# Patient Record
Sex: FEMALE | Race: BLACK OR AFRICAN AMERICAN | Employment: UNEMPLOYED | URBAN - METROPOLITAN AREA
[De-identification: names, ages, dates, MRNs, and addresses within clinical notes are randomized per-mention and may not be internally consistent; named-entity substitution may affect disease eponyms.]

---

## 2018-07-05 ENCOUNTER — HOSPITAL ENCOUNTER (OUTPATIENT)
Dept: LAB | Age: 23
Discharge: HOME | End: 2018-07-05
Payer: OTHER GOVERNMENT

## 2018-07-05 LAB
CHLAMYDIA, EXTERNAL: NORMAL
HBSAG, EXTERNAL: NORMAL
HEPATITIS C AB,   EXT: NORMAL
HIV, EXTERNAL: NORMAL
N. GONORRHEA, EXTERNAL: NORMAL
RPR, EXTERNAL: NORMAL
RUBELLA, EXTERNAL: NORMAL
TYPE, ABO & RH, EXTERNAL: NORMAL

## 2018-07-05 PROCEDURE — 88175 CYTOPATH C/V AUTO FLUID REDO: CPT | Performed by: OBSTETRICS & GYNECOLOGY

## 2018-12-24 ENCOUNTER — HOSPITAL ENCOUNTER (OUTPATIENT)
Age: 23
Discharge: HOME | End: 2018-12-24
Attending: OBSTETRICS & GYNECOLOGY | Admitting: SPECIALIST
Payer: OTHER GOVERNMENT

## 2018-12-24 VITALS
OXYGEN SATURATION: 100 % | TEMPERATURE: 98.4 F | HEART RATE: 81 BPM | RESPIRATION RATE: 20 BRPM | WEIGHT: 164 LBS | HEIGHT: 61 IN | DIASTOLIC BLOOD PRESSURE: 57 MMHG | SYSTOLIC BLOOD PRESSURE: 106 MMHG | BODY MASS INDEX: 30.96 KG/M2

## 2018-12-24 PROBLEM — O40.3XX0 POLYHYDRAMNIOS IN THIRD TRIMESTER: Status: ACTIVE | Noted: 2018-12-24

## 2018-12-24 PROBLEM — O40.3XX0 POLYHYDRAMNIOS AFFECTING PREGNANCY IN THIRD TRIMESTER: Status: ACTIVE | Noted: 2018-12-24

## 2018-12-24 PROCEDURE — 59025 FETAL NON-STRESS TEST: CPT

## 2018-12-24 PROCEDURE — 99285 EMERGENCY DEPT VISIT HI MDM: CPT

## 2018-12-24 NOTE — H&P
HPI:  Adela Boykin dx with polyhydramnios. Here for routine NST as office closed for holiday    Subjective:     Adela Boykin, 24 y.o.   at 32w5d presents to L&D for NST. Assessment:  No past medical history on file. No past surgical history on file. No Known Allergies  Prior to Admission medications    Medication Sig Start Date End Date Taking? Authorizing Provider   prenatal 47/DPVF fum/folic/dha (PRENATAL-1 PO) Take 1 Tab by mouth daily. Yes Provider, Historical        Objective:     Vitals:  Patient Vitals for the past 12 hrs:   Temp Pulse Resp BP SpO2   18 0859 98.4 °F (36.9 °C)  20     18 0858     100 %   18 0853     100 %   18 0848  92  117/76      Physical Exam:  Patient without distress. Membranes:  Intact  Fetal Heart Rate: Baseline: 140 per minute  Variability: moderate  Accelerations: yes  Decelerations: isolated prolonged    Assessment/Plan:     Assessment:   Patient Active Problem List   Diagnosis Code    Polyhydramnios in third trimester O40. 3XX0    Polyhydramnios affecting pregnancy in third trimester O40. 3XX0     Plan: DC home with standard & ER labor precautions.  Follow-up in office for surveillance as scheduled     Signed By:  Celian Orona CNM     2018

## 2018-12-24 NOTE — PROGRESS NOTES
325/7 wks arrived ambulatory for scheduled  nst due to polyhydramnios   efm applied  Pt denies concerns at present sh e is feeling good fetal movement  0905  2 variiable decels noted with recovery to baseline after [id number]  1015  No decels noted in last 90 min  Category 1 tracing now   1025  k galdino gipson reviewed efm strip  She will come and see pt soon  1100  k galdino gipson in room to see pt  Strip reviewed  Order for disch receibved  Pt to f/u in office  for next nst  Prenatal precautions,kick counts reviewed  Pt vu  Time for questions given  1105 pt disch to home

## 2019-01-18 LAB — GRBS, EXTERNAL: NORMAL

## 2019-01-31 ENCOUNTER — HOSPITAL ENCOUNTER (INPATIENT)
Age: 24
LOS: 3 days | Discharge: HOME | End: 2019-02-03
Attending: OBSTETRICS & GYNECOLOGY | Admitting: SPECIALIST
Payer: OTHER GOVERNMENT

## 2019-01-31 PROBLEM — O42.90 PROM (PREMATURE RUPTURE OF MEMBRANES): Status: ACTIVE | Noted: 2019-01-31

## 2019-01-31 PROBLEM — Z34.90 ENCOUNTER FOR INDUCTION OF LABOR: Status: ACTIVE | Noted: 2019-01-31

## 2019-01-31 LAB
ABO + RH BLD: NORMAL
BASOPHILS # BLD: 0 K/UL (ref 0–0.1)
BASOPHILS NFR BLD: 0 % (ref 0–2)
BLOOD GROUP ANTIBODIES SERPL: NORMAL
DIFFERENTIAL METHOD BLD: ABNORMAL
EOSINOPHIL # BLD: 0.1 K/UL (ref 0–0.4)
EOSINOPHIL NFR BLD: 1 % (ref 0–5)
ERYTHROCYTE [DISTWIDTH] IN BLOOD BY AUTOMATED COUNT: 13.8 % (ref 11.6–14.5)
HCT VFR BLD AUTO: 33 % (ref 35–45)
HGB BLD-MCNC: 10.3 G/DL (ref 12–16)
LYMPHOCYTES # BLD: 2 K/UL (ref 0.9–3.6)
LYMPHOCYTES NFR BLD: 21 % (ref 21–52)
MCH RBC QN AUTO: 24.7 PG (ref 24–34)
MCHC RBC AUTO-ENTMCNC: 31.2 G/DL (ref 31–37)
MCV RBC AUTO: 79.1 FL (ref 74–97)
MONOCYTES # BLD: 0.7 K/UL (ref 0.05–1.2)
MONOCYTES NFR BLD: 7 % (ref 3–10)
NEUTS SEG # BLD: 6.7 K/UL (ref 1.8–8)
NEUTS SEG NFR BLD: 71 % (ref 40–73)
PLATELET # BLD AUTO: 167 K/UL (ref 135–420)
PMV BLD AUTO: 12.8 FL (ref 9.2–11.8)
RBC # BLD AUTO: 4.17 M/UL (ref 4.2–5.3)
SPECIMEN EXP DATE BLD: NORMAL
WBC # BLD AUTO: 9.4 K/UL (ref 4.6–13.2)

## 2019-01-31 PROCEDURE — 85025 COMPLETE CBC W/AUTO DIFF WBC: CPT

## 2019-01-31 PROCEDURE — 65270000029 HC RM PRIVATE

## 2019-01-31 PROCEDURE — 86900 BLOOD TYPING SEROLOGIC ABO: CPT

## 2019-01-31 PROCEDURE — 74011250636 HC RX REV CODE- 250/636: Performed by: ADVANCED PRACTICE MIDWIFE

## 2019-01-31 RX ORDER — MAG HYDROX/ALUMINUM HYD/SIMETH 200-200-20
15 SUSPENSION, ORAL (FINAL DOSE FORM) ORAL
Status: DISCONTINUED | OUTPATIENT
Start: 2019-01-31 | End: 2019-02-01 | Stop reason: HOSPADM

## 2019-01-31 RX ORDER — OXYTOCIN/0.9 % SODIUM CHLORIDE 30/500 ML
0-25 PLASTIC BAG, INJECTION (ML) INTRAVENOUS
Status: DISCONTINUED | OUTPATIENT
Start: 2019-01-31 | End: 2019-02-03 | Stop reason: HOSPADM

## 2019-01-31 RX ORDER — ONDANSETRON 2 MG/ML
4 INJECTION INTRAMUSCULAR; INTRAVENOUS
Status: DISCONTINUED | OUTPATIENT
Start: 2019-01-31 | End: 2019-02-01 | Stop reason: HOSPADM

## 2019-01-31 RX ORDER — TERBUTALINE SULFATE 1 MG/ML
0.25 INJECTION SUBCUTANEOUS
Status: DISCONTINUED | OUTPATIENT
Start: 2019-01-31 | End: 2019-02-01 | Stop reason: HOSPADM

## 2019-01-31 RX ORDER — LIDOCAINE HYDROCHLORIDE 10 MG/ML
30 INJECTION, SOLUTION EPIDURAL; INFILTRATION; INTRACAUDAL; PERINEURAL AS NEEDED
Status: DISCONTINUED | OUTPATIENT
Start: 2019-01-31 | End: 2019-02-01 | Stop reason: HOSPADM

## 2019-01-31 RX ORDER — OXYTOCIN/RINGER'S LACTATE 20/1000 ML
999 PLASTIC BAG, INJECTION (ML) INTRAVENOUS ONCE
Status: DISPENSED | OUTPATIENT
Start: 2019-01-31 | End: 2019-02-01

## 2019-01-31 RX ORDER — METHYLERGONOVINE MALEATE 0.2 MG/ML
0.2 INJECTION INTRAVENOUS AS NEEDED
Status: DISCONTINUED | OUTPATIENT
Start: 2019-01-31 | End: 2019-02-01 | Stop reason: HOSPADM

## 2019-01-31 RX ORDER — MISOPROSTOL 200 UG/1
800 TABLET ORAL
Status: DISCONTINUED | OUTPATIENT
Start: 2019-01-31 | End: 2019-02-01 | Stop reason: HOSPADM

## 2019-01-31 RX ORDER — TRISODIUM CITRATE DIHYDRATE AND CITRIC ACID MONOHYDRATE 500; 334 MG/5ML; MG/5ML
30 SOLUTION ORAL AS NEEDED
Status: DISCONTINUED | OUTPATIENT
Start: 2019-01-31 | End: 2019-02-01 | Stop reason: HOSPADM

## 2019-01-31 RX ORDER — OXYTOCIN/RINGER'S LACTATE 20/1000 ML
125 PLASTIC BAG, INJECTION (ML) INTRAVENOUS CONTINUOUS
Status: DISCONTINUED | OUTPATIENT
Start: 2019-01-31 | End: 2019-02-01 | Stop reason: HOSPADM

## 2019-01-31 RX ORDER — NALBUPHINE HYDROCHLORIDE 10 MG/ML
10 INJECTION, SOLUTION INTRAMUSCULAR; INTRAVENOUS; SUBCUTANEOUS
Status: DISCONTINUED | OUTPATIENT
Start: 2019-01-31 | End: 2019-02-01 | Stop reason: HOSPADM

## 2019-01-31 RX ORDER — CARBOPROST TROMETHAMINE 250 UG/ML
250 INJECTION, SOLUTION INTRAMUSCULAR
Status: DISCONTINUED | OUTPATIENT
Start: 2019-01-31 | End: 2019-02-01 | Stop reason: HOSPADM

## 2019-01-31 RX ORDER — ACETAMINOPHEN 650 MG/1
650 SUPPOSITORY RECTAL
Status: DISCONTINUED | OUTPATIENT
Start: 2019-01-31 | End: 2019-02-01 | Stop reason: HOSPADM

## 2019-01-31 RX ORDER — SODIUM CHLORIDE, SODIUM LACTATE, POTASSIUM CHLORIDE, CALCIUM CHLORIDE 600; 310; 30; 20 MG/100ML; MG/100ML; MG/100ML; MG/100ML
125 INJECTION, SOLUTION INTRAVENOUS CONTINUOUS
Status: DISCONTINUED | OUTPATIENT
Start: 2019-01-31 | End: 2019-02-01 | Stop reason: HOSPADM

## 2019-01-31 RX ADMIN — SODIUM CHLORIDE, SODIUM LACTATE, POTASSIUM CHLORIDE, AND CALCIUM CHLORIDE 125 ML/HR: 600; 310; 30; 20 INJECTION, SOLUTION INTRAVENOUS at 17:02

## 2019-01-31 RX ADMIN — OXYTOCIN-SODIUM CHLORIDE 0.9% IV SOLN 30 UNIT/500ML 2 MILLI-UNITS/MIN: 30-0.9/5 SOLUTION at 17:02

## 2019-01-31 NOTE — PROGRESS NOTES
1457- Pt via wheelchair from [facility] office due to SROM, pt states may have ruptured 2 days prior with a slow leak. Confirmed via ferning in office today by Dr Theo Peralta. Will admit for pitocin augmentation.

## 2019-01-31 NOTE — H&P
History & Physical 
 
Name: Jefry Bolaños MRN: [id number]  SSN: [id number] YOB: 1995  Age: 24 y.o. Sex: female Subjective:  
 
Estimated Date of Delivery: 19 OB History  Para Term  AB Living 2 1 1 0 0 0 SAB TAB Ectopic Molar Multiple Live Births  
 0 0 0 0 0 0 Ms. Michael Degree is admitted with pregnancy at 38w1d for induction of labor after being seen in the office today by Dr Yony Lind for leaking of fluid x2 days. Office exam revealed Neg pooling, equivocal nitrazine and positive ferning. Prenatal course was complicated by polyhydramnios. US today revealed an SDP of 6 cms, resolved polyhydramnios after being followed for poly from 31 wks. Prenatal care has been followed by Leonid Santos from 8 wk dating scan. Please see prenatal records for details. Previous birth in [place] in  for an  at 41 wks of 5lb 16oz male. Also had PROM and reports what sounds like chorioamnionitis. Discussed this as risk to current labor, treatment for mother, assessment and possible treatment for baby. Her questions were answered. Discussed initiation of labor to review pitocin and cytotec R/B/A and answered her questions. She would like to proceed with pitocin. Is wanting to try to avoid epidural this time. Reassured that we will follow her preferences either way. History reviewed. No pertinent past medical history. History reviewed. No pertinent surgical history. Social History Occupational History  Not on file Tobacco Use  Smoking status: Never Smoker  Smokeless tobacco: Never Used Substance and Sexual Activity  Alcohol use: No  
  Frequency: Never  Drug use: No  
 Sexual activity: Yes Family History Problem Relation Age of Onset  Hypertension Mother  Diabetes Father No Known Allergies Prior to Admission medications Medication Sig Start Date End Date Taking? Authorizing Provider prenatal 12/UGCV fum/folic/dha (PRENATAL-1 PO) Take 1 Tab by mouth daily. Provider, Historical  
  
 
Review of Systems: Pertinent items are noted in HPI. Objective:  
 
Vitals: 
Vitals:  
 01/31/19 1502 Weight: 75.3 kg (166 lb) Height: 5' 1\" (1.549 m) Physical Exam: 
Patient without distress. Abdomen: soft, nontender Fundus: soft and non tender Perineum: blood absent, amniotic fluid present in office for pos ferning. Cervical Exam: 2/50/-3 by Dr Marleni Arellano Lower Extremities:  - Edema No 
Membranes:  Premature Rupture of Membranes; Amniotic Fluid: clear fluid by report Fetal Heart Rate: Baseline: Reactive from 135 per minute Variability: moderate Accelerations: yes Decelerations: none Uterine contractions: mild and irregular Prenatal Labs:  
Lab Results Component Value Date/Time  
 Rubella, External Immune 07/05/2018 GrBStrep, External Neg 01/18/2019 HBsAg, External Neg 07/05/2018 HIV, External Neg 07/05/2018 RPR, External NR 07/05/2018 Gonorrhea, External Neg 07/05/2018 Chlamydia, External Neg 07/05/2018 Group B Strep was negative. Assessment/Plan:  
Assessment: IUP at 38 wks with PROM x 2 days per pt report. Reactive FHTs. GBS neg. Plan: Admit for induction of labor with pitocin. Dr Kyle Jaevd is in house and notified of the patient by Dr Marleni Arellano. [facility] Signed By:  Sharon Castleman, CNM January 31, 2019

## 2019-02-01 ENCOUNTER — ANESTHESIA (OUTPATIENT)
Dept: LABOR AND DELIVERY | Age: 24
End: 2019-02-01
Payer: OTHER GOVERNMENT

## 2019-02-01 ENCOUNTER — ANESTHESIA EVENT (OUTPATIENT)
Dept: LABOR AND DELIVERY | Age: 24
End: 2019-02-01
Payer: OTHER GOVERNMENT

## 2019-02-01 PROCEDURE — 3E0R3BZ INTRODUCTION OF ANESTHETIC AGENT INTO SPINAL CANAL, PERCUTANEOUS APPROACH: ICD-10-PCS | Performed by: SPECIALIST

## 2019-02-01 PROCEDURE — 74011000250 HC RX REV CODE- 250

## 2019-02-01 PROCEDURE — 74011250636 HC RX REV CODE- 250/636: Performed by: NURSE ANESTHETIST, CERTIFIED REGISTERED

## 2019-02-01 PROCEDURE — 77030007879 HC KT SPN EPDRL TELE -B: Performed by: NURSE ANESTHETIST, CERTIFIED REGISTERED

## 2019-02-01 PROCEDURE — 74011250637 HC RX REV CODE- 250/637: Performed by: OBSTETRICS & GYNECOLOGY

## 2019-02-01 PROCEDURE — 00HU33Z INSERTION OF INFUSION DEVICE INTO SPINAL CANAL, PERCUTANEOUS APPROACH: ICD-10-PCS | Performed by: SPECIALIST

## 2019-02-01 PROCEDURE — 74011250636 HC RX REV CODE- 250/636

## 2019-02-01 PROCEDURE — 75410000003 HC RECOV DEL/VAG/CSECN EA 0.5 HR

## 2019-02-01 PROCEDURE — 10907ZC DRAINAGE OF AMNIOTIC FLUID, THERAPEUTIC FROM PRODUCTS OF CONCEPTION, VIA NATURAL OR ARTIFICIAL OPENING: ICD-10-PCS | Performed by: SPECIALIST

## 2019-02-01 PROCEDURE — 76060000078 HC EPIDURAL ANESTHESIA

## 2019-02-01 PROCEDURE — 74011250637 HC RX REV CODE- 250/637: Performed by: ADVANCED PRACTICE MIDWIFE

## 2019-02-01 PROCEDURE — 75410000000 HC DELIVERY VAGINAL/SINGLE

## 2019-02-01 PROCEDURE — 75410000002 HC LABOR FEE PER 1 HR

## 2019-02-01 PROCEDURE — 74011000250 HC RX REV CODE- 250: Performed by: NURSE ANESTHETIST, CERTIFIED REGISTERED

## 2019-02-01 PROCEDURE — 65270000029 HC RM PRIVATE

## 2019-02-01 PROCEDURE — 74011250636 HC RX REV CODE- 250/636: Performed by: ADVANCED PRACTICE MIDWIFE

## 2019-02-01 PROCEDURE — 74011250637 HC RX REV CODE- 250/637

## 2019-02-01 RX ORDER — HYDROCORTISONE 25 MG/G
CREAM TOPICAL
Status: DISCONTINUED | OUTPATIENT
Start: 2019-02-01 | End: 2019-02-03 | Stop reason: HOSPADM

## 2019-02-01 RX ORDER — FENTANYL CITRATE 50 UG/ML
INJECTION, SOLUTION INTRAMUSCULAR; INTRAVENOUS
Status: COMPLETED
Start: 2019-02-01 | End: 2019-02-01

## 2019-02-01 RX ORDER — CASTOR OIL 100 %
OIL (ML) ORAL
Status: COMPLETED
Start: 2019-02-01 | End: 2019-02-01

## 2019-02-01 RX ORDER — EPHEDRINE SULFATE 50 MG/ML
10 INJECTION, SOLUTION INTRAVENOUS AS NEEDED
Status: DISCONTINUED | OUTPATIENT
Start: 2019-02-01 | End: 2019-02-01 | Stop reason: HOSPADM

## 2019-02-01 RX ORDER — IBUPROFEN 400 MG/1
800 TABLET ORAL
Status: DISCONTINUED | OUTPATIENT
Start: 2019-02-01 | End: 2019-02-03 | Stop reason: HOSPADM

## 2019-02-01 RX ORDER — AMOXICILLIN 250 MG
1 CAPSULE ORAL
Status: DISCONTINUED | OUTPATIENT
Start: 2019-02-01 | End: 2019-02-03 | Stop reason: HOSPADM

## 2019-02-01 RX ORDER — PHENYLEPHRINE HCL IN 0.9% NACL 0.4MG/10ML
100 SYRINGE (ML) INTRAVENOUS AS NEEDED
Status: DISCONTINUED | OUTPATIENT
Start: 2019-02-01 | End: 2019-02-01 | Stop reason: HOSPADM

## 2019-02-01 RX ORDER — ACETAMINOPHEN 325 MG/1
650 TABLET ORAL
Status: DISCONTINUED | OUTPATIENT
Start: 2019-02-01 | End: 2019-02-03 | Stop reason: HOSPADM

## 2019-02-01 RX ORDER — OXYCODONE AND ACETAMINOPHEN 5; 325 MG/1; MG/1
2 TABLET ORAL
Status: DISCONTINUED | OUTPATIENT
Start: 2019-02-01 | End: 2019-02-03 | Stop reason: HOSPADM

## 2019-02-01 RX ORDER — ZOLPIDEM TARTRATE 5 MG/1
5 TABLET ORAL
Status: DISCONTINUED | OUTPATIENT
Start: 2019-02-01 | End: 2019-02-03 | Stop reason: HOSPADM

## 2019-02-01 RX ORDER — FENTANYL CITRATE 50 UG/ML
100 INJECTION, SOLUTION INTRAMUSCULAR; INTRAVENOUS ONCE
Status: COMPLETED | OUTPATIENT
Start: 2019-02-01 | End: 2019-02-01

## 2019-02-01 RX ORDER — ROPIVACAINE HYDROCHLORIDE 2 MG/ML
INJECTION, SOLUTION EPIDURAL; INFILTRATION; PERINEURAL AS NEEDED
Status: DISCONTINUED | OUTPATIENT
Start: 2019-02-01 | End: 2019-02-01 | Stop reason: HOSPADM

## 2019-02-01 RX ORDER — PROMETHAZINE HYDROCHLORIDE 25 MG/ML
25 INJECTION, SOLUTION INTRAMUSCULAR; INTRAVENOUS
Status: DISCONTINUED | OUTPATIENT
Start: 2019-02-01 | End: 2019-02-03 | Stop reason: HOSPADM

## 2019-02-01 RX ADMIN — DOCUSATE SODIUM AND SENNOSIDES 1 TABLET: 8.6; 5 TABLET, FILM COATED ORAL at 18:26

## 2019-02-01 RX ADMIN — CASTOR OIL: 1 LIQUID ORAL at 04:45

## 2019-02-01 RX ADMIN — Medication 125 ML/HR: at 04:48

## 2019-02-01 RX ADMIN — ROPIVACAINE HYDROCHLORIDE 6 ML: 2 INJECTION, SOLUTION EPIDURAL; INFILTRATION; PERINEURAL at 03:25

## 2019-02-01 RX ADMIN — NALBUPHINE HYDROCHLORIDE 10 MG: 10 INJECTION, SOLUTION INTRAMUSCULAR; INTRAVENOUS; SUBCUTANEOUS at 00:34

## 2019-02-01 RX ADMIN — EPHEDRINE SULFATE 10 MG: 50 INJECTION, SOLUTION INTRAVENOUS at 03:46

## 2019-02-01 RX ADMIN — SODIUM CHLORIDE, SODIUM LACTATE, POTASSIUM CHLORIDE, AND CALCIUM CHLORIDE 125 ML/HR: 600; 310; 30; 20 INJECTION, SOLUTION INTRAVENOUS at 00:30

## 2019-02-01 RX ADMIN — Medication 10 ML/HR: at 03:26

## 2019-02-01 RX ADMIN — FENTANYL CITRATE 100 MCG: 50 INJECTION, SOLUTION INTRAMUSCULAR; INTRAVENOUS at 03:20

## 2019-02-01 RX ADMIN — HYDROCORTISONE 2.5%: 25 CREAM TOPICAL at 15:33

## 2019-02-01 RX ADMIN — SODIUM CHLORIDE, SODIUM LACTATE, POTASSIUM CHLORIDE, AND CALCIUM CHLORIDE 500 ML: 600; 310; 30; 20 INJECTION, SOLUTION INTRAVENOUS at 03:03

## 2019-02-01 RX ADMIN — Medication 1 SPRAY: at 10:28

## 2019-02-01 RX ADMIN — SODIUM CHLORIDE, SODIUM LACTATE, POTASSIUM CHLORIDE, AND CALCIUM CHLORIDE 1000 ML: 600; 310; 30; 20 INJECTION, SOLUTION INTRAVENOUS at 03:02

## 2019-02-01 RX ADMIN — IBUPROFEN 800 MG: 400 TABLET ORAL at 18:25

## 2019-02-01 NOTE — ROUTINE PROCESS
TRANSFER - IN REPORT: 
 
Verbal report received from NEO Mejias RN(name) on [facility]  being received from L&D recovery(unit) for routine progression of care Report consisted of patients Situation, Background, Assessment and  
Recommendations(SBAR). Information from the following report(s) SBAR, Kardex, Procedure Summary, Intake/Output and Recent Results was reviewed with the receiving nurse. Opportunity for questions and clarification was provided. Assessment completed upon patients arrival to unit and care assumed. 0840--assisted out of bed to the bathroom and voided qs--tolerated well without weakness--legs able to bear weight--chelly care reviewed--back to bed--assessment done--denies pain--may be up and about on her own--has a hemorrhoid--will order hydrocortisone cream and will provide Dermaplast Spray and Tucks for soreness--po fluids encouraged. 1028--instructed on the use of Tucks and Dermaplast Spray--verbalizes understanding 1240--voiding qs--remains comfortable 1535--plans to shower--denies pain. 1830--vital signs stable--voiding qs--breast feeing is a work in progress.

## 2019-02-01 NOTE — PROGRESS NOTES
Problem: Falls - Risk of 
Goal: *Absence of Falls Document Gregoria Schroeder Fall Risk and appropriate interventions in the flowsheet. Outcome: Progressing Towards Goal 
Fall Risk Interventions:

## 2019-02-01 NOTE — LACTATION NOTE
Mom states baby has nursed well but, sleepy at last feeding. Discussed colostrum, size of stomach, nursing pattern/expectations, latch. Encouraged to ask for help with feedings when baby ready to eat. Info sheet, daily log and resource list given.

## 2019-02-01 NOTE — PROGRESS NOTES
Pt transferred to [facility]- pt up and amb with on c/o- report given to [facility] per ramone gonzalez

## 2019-02-01 NOTE — PROGRESS NOTES
Labor Progress Note Jeffrey Everett is feeling comfortable with her epidural. Variables noted with FHTs. Mccloud placed. Pelvic exam:     
 Cervical Exam 
Dilation (cm): stretchy 7 Eff: 80 % Station: 0 Vaginal exam done by? : Malia Palacios RN 
Membrane Status: AROM(by Margaret Gandara CNM) FHTs 120s with moderate variability and variables noted. UCs q 2-3 with pitocin at 8 mu 
/65 Mat pulse is 114 Patient Vitals for the past 4 hrs: 
 Temp Pulse BP  
19 0131  72 99/65  
19 0101  65 97/56  
19 0032  (!) 143 105/62  
19 0001 97.7 °F (36.5 °C) 67 113/69 Temp (24hrs), Av.9 °F (36.6 °C), Min:97.7 °F (36.5 °C), Max:98.1 °F (36.7 °C) Recent Labs  
  19 
1600 WBC 9.4 HGB 10.3* Assessment:Good pain management. Progressing. Category 2 tracing. Plan: Anticipate . Donald Ontiveros CNM 
2019 
3:34 AM

## 2019-02-01 NOTE — ROUTINE PROCESS
[facility] care of patient, received report from Alessia Rod 32 Patient sitting up on side of bed, FOB & patient's son at bedside, patient denies any needs at this time. 2200 Patient denies any needs at this time. 0030 Patient c/o pain 7/10 with contractions, requests pain medication. 0215 AROM clear fluid noted by GERBER Asher CNM, SVE 6/80/0 
0300 Patient c/o pain 9/10 with contractions requests epidural, Jennifer Bailey CRNA at bedside to place epidural catheter. 9623 Test dose administered through epidural catheter by Boston Shetty CNM at bedside, SVE 7/80/0. 
0400 CNM notified of low blood pressures and Ephedrine given, patient is asymptomatic, fetal heart tracing variables resolved with position change. 0406 Epidural Fetanyl rate change to 6 mu. 
0425 GERBER Asher CNM at bedside, patient starting to push. 0448  of viable baby girl by Dr. ROLAND [facility] at bedside to evaluate infant. 5076 Bedside and Verbal shift change report given to Zafar Kirkland RN (oncoming nurse) by Leigh Ann Bearden RN (offgoing nurse). Report included the following information SBAR, Kardex, Intake/Output, MAR and Recent Results.

## 2019-02-01 NOTE — ANESTHESIA POSTPROCEDURE EVALUATION
* No procedures listed *. Anesthesia Post Evaluation Patient location during evaluation: bedside Patient participation: complete - patient participated Level of consciousness: awake and alert Pain management: satisfactory to patient Airway patency: patent Anesthetic complications: no 
Cardiovascular status: stable Respiratory status: room air Hydration status: stable Post anesthesia nausea and vomiting:  none Visit Vitals /70 (BP 1 Location: Right arm, BP Patient Position: At rest) Pulse 90 Temp 37.1 °C (98.7 °F) Resp 16 Ht 5' 1\" (1.549 m) Wt 75.3 kg (166 lb) Breastfeeding? No  
BMI 31.37 kg/m²

## 2019-02-01 NOTE — ANESTHESIA PROCEDURE NOTES
Epidural Block Start time: 2/1/2019 3:00 AM 
End time: 2/1/2019 3:26 AM 
Performed by: Claudia Raman CRNA Authorized by: Claudia Raman CRNA Pre-Procedure Indication: labor epidural   
Preanesthetic Checklist: patient identified, risks and benefits discussed, anesthesia consent, site marked, patient being monitored, timeout performed and anesthesia consent Timeout Time: 03:07 Epidural:  
Patient position:  Seated Prep region:  Lumbar Prep: Betadine Location:  L3-4 Needle and Epidural Catheter:  
Needle Type:  Tuohy Needle Gauge:  18 G Injection Technique:  Loss of resistance using saline Attempts:  2 Catheter Size:  20 G Catheter at Skin Depth (cm):  12 Depth in Epidural Space (cm):  5 Events: no blood with aspiration, no cerebrospinal fluid with aspiration, no paresthesia and negative aspiration test   
Test Dose:  Negative Assessment:  
Catheter Secured:  Tape and tegaderm Insertion:  Uncomplicated Patient tolerance:  Patient tolerated the procedure well with no immediate complications

## 2019-02-01 NOTE — PROGRESS NOTES
Labor Progress Note Bonnie Lawler is doing well. Discussed AROM. She is considering an epidural.  Offer to wait for AROM after epidural but she opts to have AROM and assess coping. Initially after AROM wants to proceed unmedicated but changes her mind with next contraction. Pelvic exam:     
 Cervical Exam 
Dilation (cm): 6 Eff: 80 % Station: 0 Vaginal exam done by? : Panchito Arango, RN 
Membrane Status: AROM(by Humera Eller CNM) FHTs 125 with moderate variability, brief variables UCs q 2-3 with pitocin at 8 mu Fetal Heart rate: No data found. Patient Vitals for the past 4 hrs: 
 Temp Pulse BP  
19 0131  72 99/65  
19 0101  65 97/56  
19 0032  (!) 143 105/62  
19 0001 97.7 °F (36.5 °C) 67 113/69  
19 2331  66 116/67  
19 2301  74 96/60  
19 2231  69 107/76 Temp (24hrs), Av.9 °F (36.6 °C), Min:97.7 °F (36.5 °C), Max:98.1 °F (36.7 °C) Recent Labs  
  19 
1600 WBC 9.4 HGB 10.3* Assessment:Progressing well. Maternal and fetal wellbeing. Plan: Epidural planned. Anticipate . Lindy Samuels CNM 
2019 
2:29 AM

## 2019-02-01 NOTE — ANESTHESIA PREPROCEDURE EVALUATION
Anesthetic History No history of anesthetic complications Review of Systems / Medical History Pulmonary Within defined limits Neuro/Psych Within defined limits Cardiovascular Within defined limits GI/Hepatic/Renal 
Within defined limits Endo/Other Within defined limits Other Findings Physical Exam 
 
Airway Mallampati: II 
TM Distance: > 6 cm Neck ROM: normal range of motion Mouth opening: Normal 
 
 Cardiovascular Regular rate and rhythm,  S1 and S2 normal,  no murmur, click, rub, or gallop Dental 
No notable dental hx Pulmonary Breath sounds clear to auscultation Abdominal 
GI exam deferred Other Findings Anesthetic Plan ASA: 2 Anesthetic plan and risks discussed with: Patient

## 2019-02-01 NOTE — L&D DELIVERY NOTE
Delivery Summary    Patient: Dominik Valentino MRN: [id number]  SSN: [id number]    YOB: 1995  Age: 24 y.o. Sex: female       Information for the patient's :  Junior Velasquez [[id number]]       Labor Events:    Labor: No   Rupture Date: 2019   Rupture Time: 2:24 AM   Rupture Type AROM   Amniotic Fluid Volume:      Amniotic Fluid Description: Clear     Induction: Oxytocin       Augmentation: Oxytocin   Labor Events: None     Cervical Ripening:     None     Delivery Events:  Episiotomy: None   Laceration(s): Left labial     Repaired: None    Number of Repair Packets:     Suture Type and Size: None     Estimated Blood Loss (ml):  ml 300cc         Delivery Date: 2019    Delivery Time: 4:48 AM  Delivery Type: Vaginal, Spontaneous  Sex:  Female     Gestational Age: 36w4d   Delivery Clinician:  Mark Asher/Lyn BRAY  Living Status: Living   Delivery Location: L&D      Controlled spontaneous vaginal delivery of a vigorous female infant with a snug nuchal cord delivered via sommersault maneuver onto maternal abdomen by KATARINA Gerardo. Good cry. Nursery to the bedside. Apgars 8/9. Signs of placental separation prompt double clamping of the cord for cut by dad. Placenta delivered spontaneously and intact via Schultze mechanism. 3VC noted. Vagina and perineum intact. Left labial laceration and right labial laceration hemostatic, discussed with Ilia and left unrepaired.  cc Dyad stable and recovering in the birthing room skin to skin. Dr Tor Talavera is in house and notified of the birth.  Ashok Goltz, CNM            APGARS  One minute Five minutes Ten minutes   Skin color: 0   1        Heart rate: 2   2        Grimace: 2   2        Muscle tone: 2   2        Breathin   2        Totals: 8   9            Presentation: Vertex    Position:   Occiput Anterior  Resuscitation Method:  Tactile Stimulation     Meconium Stained: None      Cord Information: 3 Vessels  Complications: None;Nuchal Cord Without Compressions  Cord around: shoulders  Delayed cord clamping? Yes  Cord clamped date/time:2019  4:52 AM  Disposition of Cord Blood: Lab    Blood Gases Sent?: No    Placenta:  Date/Time: 2019  4:53 AM  Removal: Spontaneous      Appearance: Normal      Measurements:  Birth Weight:        Birth Length:        Head Circumference:        Chest Circumference:       Abdominal Girth: Other Providers:   ;Verne Mcardle J;;;;;HERBER NAJERA;;Nina ROWLAND., Obstetrician;Primary Nurse;Primary  Nurse;Nicu Nurse;Neonatologist;Anesthesiologist;Pediatrician;Nurse Practitioner;Midwife;Nursery Nurse           Group B Strep:   Lab Results   Component Value Date/Time    Majo, External Neg 2019     Information for the patient's :  Lela Mora [[id number]]   No results found for: ABORH, PCTABR, PCTDIG, BILI, ABORHEXT, ABORH    No results for input(s): PCO2CB, PO2CB, HCO3I, SO2I, IBD, PTEMPI, SPECTI, PHICB, ISITE, IDEV, IALLEN in the last 72 hours.

## 2019-02-02 PROBLEM — Z34.90 ENCOUNTER FOR INDUCTION OF LABOR: Status: RESOLVED | Noted: 2019-01-31 | Resolved: 2019-02-02

## 2019-02-02 PROBLEM — O42.90 PROM (PREMATURE RUPTURE OF MEMBRANES): Status: RESOLVED | Noted: 2019-01-31 | Resolved: 2019-02-02

## 2019-02-02 LAB
HCT VFR BLD AUTO: 30.2 % (ref 35–45)
HGB BLD-MCNC: 9.1 G/DL (ref 12–16)

## 2019-02-02 PROCEDURE — 74011250637 HC RX REV CODE- 250/637: Performed by: ADVANCED PRACTICE MIDWIFE

## 2019-02-02 PROCEDURE — 36415 COLL VENOUS BLD VENIPUNCTURE: CPT

## 2019-02-02 PROCEDURE — 85018 HEMOGLOBIN: CPT

## 2019-02-02 PROCEDURE — 85014 HEMATOCRIT: CPT

## 2019-02-02 PROCEDURE — 65270000029 HC RM PRIVATE

## 2019-02-02 RX ORDER — DOCUSATE SODIUM 100 MG/1
100 CAPSULE, LIQUID FILLED ORAL
Qty: 60 CAP | Refills: 0 | Status: SHIPPED | OUTPATIENT
Start: 2019-02-02 | End: 2019-05-03

## 2019-02-02 RX ORDER — IBUPROFEN 800 MG/1
800 TABLET ORAL
Qty: 30 TAB | Refills: 0 | Status: SHIPPED | OUTPATIENT
Start: 2019-02-02

## 2019-02-02 RX ORDER — DOCUSATE SODIUM 100 MG/1
100 CAPSULE, LIQUID FILLED ORAL
Status: DISCONTINUED | OUTPATIENT
Start: 2019-02-02 | End: 2019-02-03 | Stop reason: HOSPADM

## 2019-02-02 RX ADMIN — IBUPROFEN 800 MG: 400 TABLET ORAL at 18:46

## 2019-02-02 RX ADMIN — Medication 1 CAPSULE: at 10:09

## 2019-02-02 RX ADMIN — DOCUSATE SODIUM 100 MG: 100 CAPSULE, LIQUID FILLED ORAL at 10:09

## 2019-02-02 RX ADMIN — IBUPROFEN 800 MG: 400 TABLET ORAL at 03:55

## 2019-02-02 NOTE — DISCHARGE SUMMARY
Obstetrical Discharge Summary     Name: Chandrika Melgoza MRN: [id number]  SSN: [id number]    YOB: 1995  Age: 24 y.o. Sex: female      Admit Date: 2019    Discharge Date: 2019     Admitting Physician: Caleb Reed MD     Attending Physician:  Mayela Serrano MD     * Admission Diagnoses: Encounter for induction of labor    * Discharge Diagnoses:   Information for the patient's :  Jesenia March [[id number]]   Delivery of a 2.885 kg female infant via Vaginal, Spontaneous on 2019 at 4:48 AM  by . Apgars were 8 and 9. Additional Diagnoses:   Hospital Problems as of 2019 Date Reviewed: 2019          Codes Class Noted - Resolved POA    * (Principal) Postpartum care following vaginal delivery ICD-10-CM: Z39.2  ICD-9-CM: V24.2  2019 - Present No        RESOLVED: PROM (premature rupture of membranes) ICD-10-CM: O42.90  ICD-9-CM: 658.10  2019 - 2019 Yes        RESOLVED: Encounter for induction of labor ICD-10-CM: Z34.90  ICD-9-CM: V22.1  2019 - 2019 Yes             Lab Results   Component Value Date/Time    ABO/Rh(D) A POSITIVE 2019 04:00 PM    Rubella, External Immune 2018    GrBStrep, External Neg 2019    ABO,Rh A+ 2018      Immunization History   Administered Date(s) Administered    Influenza Vaccine 10/24/2018    Tdap 2018       * Procedures:          * Discharge Condition: stable    * Hospital Course: Normal hospital course following the delivery. * Disposition: Home    Discharge Medications:   Current Discharge Medication List      START taking these medications    Details   ibuprofen (MOTRIN) 800 mg tablet Take 1 Tab by mouth every eight (8) hours as needed. Qty: 30 Tab, Refills: 0      docusate sodium (COLACE) 100 mg capsule Take 1 Cap by mouth two (2) times daily as needed for Constipation for up to 90 days.   Qty: 60 Cap, Refills: 0      iron polysacch complex-b12-fa (NIFEREX FORTE) capsule Take 1 Cap by mouth daily. Qty: 30 Cap, Refills: 1         CONTINUE these medications which have NOT CHANGED    Details   prenatal 32/QALU fum/folic/dha (PRENATAL-1 PO) Take 1 Tab by mouth daily. * Follow-up Care/Patient Instructions:   Activity: Activity as tolerated, No sex for 6 weeks and No heavy lifting for 6 weeks  Diet: Regular Diet    Follow-up Information     Follow up With Specialties Details Why Contact Info    Lory Orosco MD Internal Medicine   [address] [address]  In 6 weeks  [facility] [address]  [phone]           Signed By:  Gia Jacobs CNM     February 2, 2019

## 2019-02-02 NOTE — ROUTINE PROCESS
Bedside shift change report given to Kai Phipps RN (oncoming nurse) by Melanie Cates RN (offgoing nurse). Report included the following information SBAR, Kardex, Procedure Summary, Intake/Output, MAR and Recent Results.

## 2019-02-02 NOTE — PROGRESS NOTES
Problem: Falls - Risk of 
Goal: *Absence of Falls Document Kwesi Sigala Fall Risk and appropriate interventions in the flowsheet. Outcome: Progressing Towards Goal 
Fall Risk Interventions:

## 2019-02-02 NOTE — PROGRESS NOTES
Progress Note Patient: Poonam Tobin MRN: [id number] YOB: 1995  Age: 24 y.o. Subjective:  
 
Postpartum Day: 2 The patient is feeling well. Pain is  well controlled with current medications. Urinary output is adequate. Baby is feeding via breast without difficulty. Objective:  
  
Patient Vitals for the past 12 hrs: 
 Temp Pulse Resp BP SpO2  
02/02/19 0812 97.7 °F (36.5 °C) 62 17 111/79 100 % 02/02/19 0338 98 °F (36.7 °C) 75 17 106/64 100 % General:    alert Lochia:  appropriate Uterine Fundus:   firm @ umbilicus Perineum:  well-approximated DVT Evaluation:  No evidence of DVT seen on physical exam.  
 
Lab/Data Review: 
Recent Results (from the past 24 hour(s)) HEMOGLOBIN Collection Time: 02/02/19  6:08 AM  
Result Value Ref Range HGB 9.1 (L) 12.0 - 16.0 g/dL HEMATOCRIT Collection Time: 02/02/19  6:08 AM  
Result Value Ref Range HCT 30.2 (L) 35.0 - 45.0 % All lab results for the last 24 hours reviewed. Assessment:  
 
Delivery: spontaneous vaginal delivery; anemia Plan:  
 
Doing well postpartum vaginal delivery. Plan DC home today. Discussed low hgb and fe supplement. Follow-up in the office in 6 weeks. Call prn. Current Discharge Medication List  
  
START taking these medications Details  
ibuprofen (MOTRIN) 800 mg tablet Take 1 Tab by mouth every eight (8) hours as needed. Qty: 30 Tab, Refills: 0  
  
docusate sodium (COLACE) 100 mg capsule Take 1 Cap by mouth two (2) times daily as needed for Constipation for up to 90 days. Qty: 60 Cap, Refills: 0  
  
iron polysacch complex-b12-fa (NIFEREX FORTE) capsule Take 1 Cap by mouth daily. Qty: 30 Cap, Refills: 1 CONTINUE these medications which have NOT CHANGED Details  
prenatal 54/SVRE fum/folic/dha (PRENATAL-1 PO) Take 1 Tab by mouth daily. Signed By: Fran Alexis CNM February 2, 2019

## 2019-02-02 NOTE — DISCHARGE INSTRUCTIONS

## 2019-02-02 NOTE — PROGRESS NOTES
0715-Bedside and Verbal shift change report given to MANAV Padilla rn (oncoming nurse) by RIKKI Schulte rn (offgoing nurse). Report included the following information SBAR, Kardex, Procedure Summary, Intake/Output, MAR and Recent Results. Patient aware of hourly rounding and not waking patient if sleeping. 0820-Sitting up in bed. no needs. denies needing any pain medication. Assessment completed at this time. Call light in reach. 0900- No needs. Eating breakfast.  
1038- No needs. Call light in reach. 1140-No needs. Call light in reach. 1245-Aware of baby going to nursery for testing. No needs call light in reach. 1330-No needs before quiet time. 80-  aware of baby not being discharged. Mother's discharge orders Discontinued. Mother is aware of not being discharged today. 1515- No needs call light in reach. 1637- Reassessment completed at this time. No needs at this time. Given more chelly pads. Breast feeding. 1743- No needs. Denies any pain. 1830- Given pain medication. No needs at this time. Family at bedside.

## 2019-02-02 NOTE — PROGRESS NOTES
Problem: Falls - Risk of 
Goal: *Absence of Falls Document Juanita Burton Fall Risk and appropriate interventions in the flowsheet. Outcome: Progressing Towards Goal 
Fall Risk Interventions: 
  
 
  
 
  
 
  
 
  
 
 
 
Comments: Hourly rounding, call light in reach. Bed low position, bed rails x2, bed brakes on.

## 2019-02-02 NOTE — LACTATION NOTE
Mother has been doing a breast/formula combination which is what she had planned. No questions/concerns. Encouraged to ask for assistance if needed.

## 2019-02-03 VITALS
WEIGHT: 166 LBS | TEMPERATURE: 98.7 F | RESPIRATION RATE: 16 BRPM | OXYGEN SATURATION: 100 % | SYSTOLIC BLOOD PRESSURE: 125 MMHG | HEART RATE: 78 BPM | DIASTOLIC BLOOD PRESSURE: 77 MMHG | BODY MASS INDEX: 31.34 KG/M2 | HEIGHT: 61 IN

## 2019-02-03 PROBLEM — O40.3XX0 POLYHYDRAMNIOS AFFECTING PREGNANCY IN THIRD TRIMESTER: Status: RESOLVED | Noted: 2018-12-24 | Resolved: 2019-02-03

## 2019-02-03 PROBLEM — O40.3XX0 POLYHYDRAMNIOS IN THIRD TRIMESTER: Status: RESOLVED | Noted: 2018-12-24 | Resolved: 2019-02-03

## 2019-02-03 PROCEDURE — 74011250637 HC RX REV CODE- 250/637: Performed by: ADVANCED PRACTICE MIDWIFE

## 2019-02-03 RX ADMIN — Medication 1 CAPSULE: at 11:37

## 2019-02-03 RX ADMIN — DOCUSATE SODIUM 100 MG: 100 CAPSULE, LIQUID FILLED ORAL at 11:37

## 2019-02-03 RX ADMIN — ACETAMINOPHEN 650 MG: 325 TABLET, FILM COATED ORAL at 11:38

## 2019-02-03 RX ADMIN — IBUPROFEN 800 MG: 400 TABLET ORAL at 02:39

## 2019-02-03 NOTE — PROGRESS NOTES
Bedside and Verbal shift change report given to Marilu Cleary RN (oncoming nurse) by Mona Moses RN (offgoing nurse). Report included the following information SBAR, Kardex, Procedure Summary, Intake/Output, MAR and Recent Results.

## 2019-02-03 NOTE — DISCHARGE SUMMARY
Obstetrical Discharge Summary       MACIEJ Kennedy MD  [address] [facility]  [address]  [phone]        Patient OI:CLAIRE FRENCH,[id number],59 y.o.,1995    Postpartum Day:    Information for the patient's :  Roddy Luu [[id number]]   3 days       Admit Date: 2019    Discharge Date: 2/3/2019     Admitting Physician: Ricardo Rodriguez MD     Admission Diagnoses: Encounter for induction of labor    Discharge Diagnoses: same as above      Additional Diagnoses:Present on Admission:   (Resolved) PROM (premature rupture of membranes)   (Resolved) Encounter for induction of labor       Procedure:        Baby link  Information for the patient's :  Roddy Bell [[id number]]     Delivery Type: Vaginal, Spontaneous   Delivery Date: 2019   Delivery Time: 4:48 AM     Birth Weight: 2.885 kg     Sex:  female  Delivery Clinician:  Tashia Vega   Gestational Age: Gestational Age: 36w4d    Presentation: Vertex   Position:    Occiput  Anterior     Apgars were 8  and 9      Resuscitation Method: Tactile Stimulation     Meconium Stained: None  Living Status: Living       Placenta Date/Time: 2019  4:53 AM   Placenta Removal: Spontaneous   Placenta Appearance: Vertex [1]     Cord Information: 3 Vessels    Cord Events: None;Nuchal Cord Without Compressions       Cord Blood Sent?:       Blood Gases Sent?:  No         Baby procedures:    Information for the patient's newbornRoberto Lombard [[id number]]          Feeding Method: Infant Feeding: Breastmilk, Formula    Immunizations:    Immunization History   Administered Date(s) Administered    Influenza Vaccine 10/24/2018    Tdap 2018        Immunizations:    Immunization History   Administered Date(s) Administered    Influenza Vaccine 10/24/2018    Tdap 2018       Group Beta Strep:   GrBStrep, External   Date Value Ref Range Status   2019 Neg  Final          WBC   Date/Time Value Ref Range Status   2019 04:00 PM 9.4 4.6 - 13.2 K/uL Final     HGB   Date/Time Value Ref Range Status   2019 06:08 AM 9.1 (L) 12.0 - 16.0 g/dL Final   2019 04:00 PM 10.3 (L) 12.0 - 16.0 g/dL Final     PLATELET   Date/Time Value Ref Range Status   2019 04:00  135 - 420 K/uL Final         Hospital Course: Unremarkable  Subjective:         Lorrie Sutton is doing well. Ready to go today. Self care at home reviewed. Objective:    Breasts Nontender and intact  Fundus firm and involuting  Lochia rubra, minimal  Legs without cords, neg Homans, minimal to no edema    Lab/Data Review:  Patient Vitals for the past 8 hrs:   BP Temp Pulse Resp SpO2   19 0232 110/73 98.3 °F (36.8 °C) 87 16 99 %       Temp (24hrs), Av.2 °F (36.8 °C), Min:98.2 °F (36.8 °C), Max:98.3 °F (36.8 °C)      WBC   Date/Time Value Ref Range Status   2019 04:00 PM 9.4 4.6 - 13.2 K/uL Final     HGB   Date/Time Value Ref Range Status   2019 06:08 AM 9.1 (L) 12.0 - 16.0 g/dL Final   2019 04:00 PM 10.3 (L) 12.0 - 16.0 g/dL Final     PLATELET   Date/Time Value Ref Range Status   2019 04:00  135 - 420 K/uL Final     Patient Instructions:   Current Discharge Medication List      START taking these medications    Details   ibuprofen (MOTRIN) 800 mg tablet Take 1 Tab by mouth every eight (8) hours as needed. Qty: 30 Tab, Refills: 0      docusate sodium (COLACE) 100 mg capsule Take 1 Cap by mouth two (2) times daily as needed for Constipation for up to 90 days. Qty: 60 Cap, Refills: 0      iron polysacch complex-b12-fa (NIFEREX FORTE) capsule Take 1 Cap by mouth daily. Qty: 30 Cap, Refills: 1         CONTINUE these medications which have NOT CHANGED    Details   prenatal 76/JLVQ fum/folic/dha (PRENATAL-1 PO) Take 1 Tab by mouth daily.              Assessment:     Status post: postpartum vaginal delivery   Recovering well  Breastfeeding  Anemia    Plan:     Postpartum care discussed including diet, ambulation, and actvitiy restrictions. Discharge instructions and questions answered for vaginal delivery.   Follow in 6 wks or prn      Signed By: Carissa Washington CNM     February 3, 2019

## 2019-02-03 NOTE — ROUTINE PROCESS
Bedside shift change report given to Kaleigh Godinez RN (oncoming nurse) by Nikita Connelly RN (offgoing nurse). Report included the following information SBAR, Kardex, Procedure Summary, Intake/Output, MAR and Recent Results.

## 2019-02-03 NOTE — PROGRESS NOTES
Problem: Falls - Risk of 
Goal: *Absence of Falls Document Christian Dust Fall Risk and appropriate interventions in the flowsheet. Outcome: Progressing Towards Goal 
Fall Risk Interventions: